# Patient Record
Sex: MALE | Race: WHITE | Employment: FULL TIME | URBAN - METROPOLITAN AREA
[De-identification: names, ages, dates, MRNs, and addresses within clinical notes are randomized per-mention and may not be internally consistent; named-entity substitution may affect disease eponyms.]

---

## 2017-03-06 ENCOUNTER — HOSPITAL ENCOUNTER (EMERGENCY)
Facility: HOSPITAL | Age: 42
Discharge: HOME | End: 2017-03-06
Attending: EMERGENCY MEDICINE
Payer: COMMERCIAL

## 2017-03-06 ENCOUNTER — APPOINTMENT (OUTPATIENT)
Dept: CV DIAGNOSTICS | Facility: HOSPITAL | Age: 42
End: 2017-03-06
Attending: PHYSICIAN ASSISTANT
Payer: COMMERCIAL

## 2017-03-06 ENCOUNTER — APPOINTMENT (OUTPATIENT)
Dept: GENERAL RADIOLOGY | Facility: HOSPITAL | Age: 42
End: 2017-03-06
Payer: COMMERCIAL

## 2017-03-06 VITALS
SYSTOLIC BLOOD PRESSURE: 106 MMHG | HEART RATE: 78 BPM | OXYGEN SATURATION: 100 % | BODY MASS INDEX: 22.66 KG/M2 | WEIGHT: 141 LBS | TEMPERATURE: 99 F | DIASTOLIC BLOOD PRESSURE: 70 MMHG | RESPIRATION RATE: 18 BRPM | HEIGHT: 66 IN

## 2017-03-06 DIAGNOSIS — S29.011A STRAIN OF LEFT PECTORALIS MUSCLE, INITIAL ENCOUNTER: Primary | ICD-10-CM

## 2017-03-06 LAB
ALBUMIN SERPL-MCNC: 4.4 G/DL (ref 3.5–4.8)
ALP LIVER SERPL-CCNC: 63 U/L (ref 45–117)
ALT SERPL-CCNC: 30 U/L (ref 17–63)
AST SERPL-CCNC: 19 U/L (ref 15–41)
ATRIAL RATE: 68 BPM
BASOPHILS # BLD AUTO: 0.05 X10(3) UL (ref 0–0.1)
BASOPHILS NFR BLD AUTO: 0.9 %
BILIRUB SERPL-MCNC: 0.5 MG/DL (ref 0.1–2)
BUN BLD-MCNC: 22 MG/DL (ref 8–20)
CALCIUM BLD-MCNC: 9 MG/DL (ref 8.3–10.3)
CHLORIDE: 105 MMOL/L (ref 101–111)
CO2: 29 MMOL/L (ref 22–32)
CREAT BLD-MCNC: 1.04 MG/DL (ref 0.7–1.3)
EOSINOPHIL # BLD AUTO: 0.11 X10(3) UL (ref 0–0.3)
EOSINOPHIL NFR BLD AUTO: 2 %
ERYTHROCYTE [DISTWIDTH] IN BLOOD BY AUTOMATED COUNT: 11.8 % (ref 11.5–16)
GLUCOSE BLD-MCNC: 119 MG/DL (ref 70–99)
HCT VFR BLD AUTO: 43.8 % (ref 37–53)
HGB BLD-MCNC: 15.7 G/DL (ref 13–17)
IMMATURE GRANULOCYTE COUNT: 0.04 X10(3) UL (ref 0–1)
IMMATURE GRANULOCYTE RATIO %: 0.7 %
LYMPHOCYTES # BLD AUTO: 1.76 X10(3) UL (ref 0.9–4)
LYMPHOCYTES NFR BLD AUTO: 31.7 %
M PROTEIN MFR SERPL ELPH: 7.6 G/DL (ref 6.1–8.3)
MCH RBC QN AUTO: 32.5 PG (ref 27–33.2)
MCHC RBC AUTO-ENTMCNC: 35.8 G/DL (ref 31–37)
MCV RBC AUTO: 90.7 FL (ref 80–99)
MONOCYTES # BLD AUTO: 0.33 X10(3) UL (ref 0.1–0.6)
MONOCYTES NFR BLD AUTO: 5.9 %
NEUTROPHIL ABS PRELIM: 3.27 X10 (3) UL (ref 1.3–6.7)
NEUTROPHILS # BLD AUTO: 3.27 X10(3) UL (ref 1.3–6.7)
NEUTROPHILS NFR BLD AUTO: 58.8 %
P AXIS: 65 DEGREES
P-R INTERVAL: 140 MS
PLATELET # BLD AUTO: 222 10(3)UL (ref 150–450)
POTASSIUM SERPL-SCNC: 4 MMOL/L (ref 3.6–5.1)
Q-T INTERVAL: 378 MS
QRS DURATION: 90 MS
QTC CALCULATION (BEZET): 401 MS
R AXIS: 80 DEGREES
RBC # BLD AUTO: 4.83 X10(6)UL (ref 4.3–5.7)
RED CELL DISTRIBUTION WIDTH-SD: 38.9 FL (ref 35.1–46.3)
SODIUM SERPL-SCNC: 140 MMOL/L (ref 136–144)
T AXIS: 58 DEGREES
TROPONIN: <0.046 NG/ML (ref ?–0.05)
VENTRICULAR RATE: 68 BPM
WBC # BLD AUTO: 5.6 X10(3) UL (ref 4–13)

## 2017-03-06 PROCEDURE — 93005 ELECTROCARDIOGRAM TRACING: CPT

## 2017-03-06 PROCEDURE — 99285 EMERGENCY DEPT VISIT HI MDM: CPT

## 2017-03-06 PROCEDURE — 93350 STRESS TTE ONLY: CPT

## 2017-03-06 PROCEDURE — 93017 CV STRESS TEST TRACING ONLY: CPT

## 2017-03-06 PROCEDURE — 71010 XR CHEST AP PORTABLE  (CPT=71010): CPT | Performed by: EMERGENCY MEDICINE

## 2017-03-06 PROCEDURE — 85025 COMPLETE CBC W/AUTO DIFF WBC: CPT

## 2017-03-06 PROCEDURE — 80053 COMPREHEN METABOLIC PANEL: CPT

## 2017-03-06 PROCEDURE — 84484 ASSAY OF TROPONIN QUANT: CPT

## 2017-03-06 PROCEDURE — 93018 CV STRESS TEST I&R ONLY: CPT | Performed by: INTERNAL MEDICINE

## 2017-03-06 PROCEDURE — 93350 STRESS TTE ONLY: CPT | Performed by: INTERNAL MEDICINE

## 2017-03-06 RX ORDER — ASPIRIN 81 MG/1
324 TABLET, CHEWABLE ORAL ONCE
Status: DISCONTINUED | OUTPATIENT
Start: 2017-03-06 | End: 2017-03-06

## 2017-03-06 NOTE — ED PROVIDER NOTES
Patient Seen in: [facility] Emergency Department    History   Patient presents with:  Chest Pain Angina (cardiovascular)    Stated Complaint:     HPI    CHIEF COMPLAINT: Left chest pain    HISTORY OF PRESENT ILLNESS: Patient is a 76-year-old male pr except as indicated as above. Past Medical History   Diagnosis Date   • Concussion      x 3 total, sports - wakebording, skiing   • Liver cyst      single small cyst, stable on serial CT scans       No past surgical history on file.     Medications :  No abdomen is soft and non tender, no masses, bowel sounds normal. No rebound, guarding or rigidity noted. No abdominal distention. Neurological:  Grossly intact, no deficits. Skin: warm and dry, no rashes. Musculoskeletal:  neck is supple non tender.  no me discharge instructions and plan. I answered all of the patient's questions prior to discharge.       Disposition and Plan     Clinical Impression:  Strain of left pectoralis muscle, initial encounter  (primary encounter diagnosis)    Disposition:  Discharge

## 2017-03-06 NOTE — PROGRESS NOTES
CARDIODIAGNOSTIC PRELIMINARY REPORT:    SHAN protocol completed for 13:36 with no new EKG changes noted; PVC's    Denied cardiac symptoms    Base:  126/74, HR 64;  Peak: 170/88,  (100% APMHR)    Pt.  Returned to ER  Dr. Lenka Fritz notified of test com

## 2017-03-06 NOTE — ED INITIAL ASSESSMENT (HPI)
Pt here for chest pain that started last week, feels like he still has some pain it is off and on chest pain

## (undated) NOTE — ED AVS SNAPSHOT
[facility] Emergency Department    [place]  [address] [address]    Phone:  [phone]    Fax:  [phone]   MRN: [id number]    Department:  [facility] Emergency Department   Date of Visit:  3/6/2 Pediatric [id number] Emergency Department   [phone]       To Check ER Wait Times:  TEXT 'ERwait' to 88449      Click [email or web address]      Or call [phone]    If you have any problems with your follow-up, please call our case [facility] before you leave. After you leave, you should follow the attached instructions. I have read and understand the instructions given to me by my caregivers. [facility]        Pharmacy Address Phone Number   [facility] [address] Final result    Impression:    PROCEDURE:  XR CHEST AP PORTABLE (CPT=71010)     TECHNIQUE:  AP chest radiograph was obtained. COMPARISON:  None. INDICATIONS:  chest pain     PATIENT STATED HISTORY:  Patient was having left sided chest pain.

## (undated) NOTE — ED AVS SNAPSHOT
[facility] Emergency Department    [place]  [facility] [address]    Phone:  [phone]    Fax:  [phone]   MRN: [id number]    Department:  [facility] Emergency Department   Date of Visit:  3/6/2 IF THERE IS ANY CHANGE OR WORSENING OF YOUR CONDITION, CALL YOUR PRIMARY CARE PHYSICIAN AT ONCE OR RETURN IMMEDIATELY TO THE EMERGENCY DEPARTMENT.     If you have been prescribed any medication(s), please fill your prescription right away and begin taking t